# Patient Record
Sex: MALE
[De-identification: names, ages, dates, MRNs, and addresses within clinical notes are randomized per-mention and may not be internally consistent; named-entity substitution may affect disease eponyms.]

---

## 2021-08-27 ENCOUNTER — HOSPITAL ENCOUNTER (OUTPATIENT)
Dept: HOSPITAL 5 - VAS | Age: 46
Discharge: HOME | End: 2021-08-27
Attending: SURGERY
Payer: COMMERCIAL

## 2021-08-27 DIAGNOSIS — I15.0: Primary | ICD-10-CM

## 2021-08-27 PROCEDURE — 93975 VASCULAR STUDY: CPT

## 2021-08-27 NOTE — VASCULAR LAB REPORT
ULTRASOUND RENAL ARTERY DUPLEX IMAGING  



INDICATION / CLINICAL INFORMATION: RENOVASCULAR HYPERTENSION.



COMPARISON: None available.



FINDINGS: Technically difficult exam.



RIGHT KIDNEY: Size = 11.3 cm. 

- Echogenicity: Normal. 

- Cortical thickness: Normal.

- Hydronephrosis: None.

- Cyst / Mass: None.  

- Stones: None seen.. 



- Renal resistive indices (RI): Not calculated. (Normal < 0.70)

- Renal/aortic ratio (RAR): 0.9    (Normal < 3.5)



LEFT KIDNEY: Size = 11.9 cm. 

- Echogenicity: Normal. 

- Cortical thickness: Normal.

- Hydronephrosis: None.

- Cyst / Mass: None.  

- Stones: None seen.. 



- Renal resistive indices (RI): Not calculated. (Normal < 0.70)

- Renal/aortic ratio (RAR): 0.9    (Normal < 3.5)



URINARY BLADDER: Not imaged.

FREE FLUID: None.

ADDITIONAL FINDINGS: None.



IMPRESSION

1. Technically difficult exam. 

2. Bilateral renal resistive indices were not calculated bilaterally, however there is no sonographic
 evidence of renal artery stenosis.



Scribed by: Angie Anton RDMS, RVT 

Scribed: 8/27/2021 12:53 PM 



 I have reviewed the images, agree with this report, and edited this report as needed.



Signer Name: Rishabh Le MD 

Signed: 8/27/2021 4:21 PM

Workstation Name: [email or web address]